# Patient Record
Sex: MALE | Race: WHITE | NOT HISPANIC OR LATINO | Employment: OTHER | URBAN - METROPOLITAN AREA
[De-identification: names, ages, dates, MRNs, and addresses within clinical notes are randomized per-mention and may not be internally consistent; named-entity substitution may affect disease eponyms.]

---

## 2018-09-27 ENCOUNTER — IMPORTED ENCOUNTER (OUTPATIENT)
Dept: URBAN - METROPOLITAN AREA CLINIC 50 | Facility: CLINIC | Age: 63
End: 2018-09-27

## 2018-10-09 ENCOUNTER — IMPORTED ENCOUNTER (OUTPATIENT)
Dept: URBAN - METROPOLITAN AREA CLINIC 50 | Facility: CLINIC | Age: 63
End: 2018-10-09

## 2018-10-09 NOTE — PATIENT DISCUSSION
"""Monitor ERM for changes. Informed patient of potential for worsening. Instructed patient to call with changes in vision. Recommend regular Amsler grid checks.  """

## 2018-11-06 ENCOUNTER — IMPORTED ENCOUNTER (OUTPATIENT)
Dept: URBAN - METROPOLITAN AREA CLINIC 50 | Facility: CLINIC | Age: 63
End: 2018-11-06

## 2018-11-06 NOTE — PATIENT DISCUSSION
"""Continue Crook Preservative Free Tears both eyes two - four times a day ."" ""Start Ocusoft Lid Scrubs both eyes . """

## 2019-10-09 NOTE — PROCEDURE NOTE: CLINICAL
PROCEDURE NOTE: Punctal Plugs, Li Marco A ([id number], [id number]) #2 Bilateral Lower Lids. 0.4mm Quintess.

## 2019-11-19 ENCOUNTER — IMPORTED ENCOUNTER (OUTPATIENT)
Dept: URBAN - METROPOLITAN AREA CLINIC 50 | Facility: CLINIC | Age: 64
End: 2019-11-19

## 2020-06-01 NOTE — PATIENT DISCUSSION
Advised regular use of Amsler grid.
Amsler grid at home. MVI/AREDS discussed. Patient to call if any changes in vision or grid card.
BLL plug today.
Cleared to continue Plaquenil.
Discussed AREDS 2 supplements, UV protection and green leafy vegetables.
Glasses Prescription given to patient.
Monitor.
Patient happy with [facility].
Recommended artificial tears to use as directed.
Recommended observation.
Smoking cessation discussed.
Stable.
VF is clear OU and mac OCT has borderline finding OS that I will follow closely. Overall, pt is stable visually.
Subdural hematoma, chronic

## 2020-06-01 NOTE — PROCEDURE NOTE: CLINICAL
PROCEDURE NOTE: Punctal Plugs, Quintess Dissolvable ([id number], [id number]) #1 Right Lower Lid. Prior to treatment, the risks/benefits/alternatives were discussed. The patient wished to proceed with procedure. Temporary collagen plugs were inserted. Patient tolerated procedure well. There were no complications. Post procedure instructions given. 0.4mm. PROCEDURE NOTE: Punctal Plugs, Shanna Keys ([id number], [id number]) #1 Left Lower Lid. Prior to treatment, the risks/benefits/alternatives were discussed. The patient wished to proceed with procedure. Temporary collagen plugs were inserted. Patient tolerated procedure well. There were no complications. Post procedure instructions given. 0.4mm.

## 2020-12-02 ENCOUNTER — IMPORTED ENCOUNTER (OUTPATIENT)
Dept: URBAN - METROPOLITAN AREA CLINIC 50 | Facility: CLINIC | Age: 65
End: 2020-12-02

## 2021-04-17 ASSESSMENT — VISUAL ACUITY
OS_OTHER: 20/100. >20/400.
OS_OTHER: 20/50.
OS_PH: 20/30
OD_OTHER: 20/50.
OD_CC: 20/25+2
OS_CC: 20/40+2
OD_CC: 20/40-
OS_CC: 20/40
OD_CC: J1
OS_CC: J1+
OD_CC: J1+
OS_BAT: 20/100
OD_BAT: 20/100
OD_CC: 20/25
OS_CC: 20/40
OS_CC: 20/30+2
OS_CC: J1+
OD_CC: J1+
OD_CC: 20/25-2
OS_CC: J1
OD_OTHER: 20/100. >20/400.

## 2021-04-17 ASSESSMENT — TONOMETRY
OD_IOP_MMHG: 11
OS_IOP_MMHG: 12
OS_IOP_MMHG: 12
OD_IOP_MMHG: 12
OD_IOP_MMHG: 10
OD_IOP_MMHG: 12
OS_IOP_MMHG: 9
OS_IOP_MMHG: 12

## 2021-12-06 ENCOUNTER — PREPPED CHART (OUTPATIENT)
Dept: URBAN - METROPOLITAN AREA CLINIC 49 | Facility: CLINIC | Age: 66
End: 2021-12-06

## 2021-12-13 ENCOUNTER — COMPREHENSIVE EXAM (OUTPATIENT)
Dept: URBAN - METROPOLITAN AREA CLINIC 53 | Facility: CLINIC | Age: 66
End: 2021-12-13

## 2021-12-13 DIAGNOSIS — H25.11: ICD-10-CM

## 2021-12-13 DIAGNOSIS — H04.123: ICD-10-CM

## 2021-12-13 DIAGNOSIS — H25.812: ICD-10-CM

## 2021-12-13 DIAGNOSIS — H43.813: ICD-10-CM

## 2021-12-13 DIAGNOSIS — H35.373: ICD-10-CM

## 2021-12-13 PROCEDURE — 92134 CPTRZ OPH DX IMG PST SGM RTA: CPT

## 2021-12-13 PROCEDURE — 92014 COMPRE OPH EXAM EST PT 1/>: CPT

## 2021-12-13 ASSESSMENT — VISUAL ACUITY
OS_CC: 20/30
OS_CC: J1+
OS_GLARE: 20/40
OD_CC: 20/30
OS_PH: 20/30
OD_GLARE: 20/40
OD_PH: 20/30
OD_CC: J1+
OD_GLARE: 20/40
OS_GLARE: 20/40

## 2021-12-13 ASSESSMENT — TONOMETRY
OS_IOP_MMHG: 12
OD_IOP_MMHG: 12

## 2022-01-18 ENCOUNTER — DIAGNOSTICS ONLY (OUTPATIENT)
Dept: URBAN - METROPOLITAN AREA CLINIC 53 | Facility: CLINIC | Age: 67
End: 2022-01-18

## 2022-01-18 DIAGNOSIS — H35.373: ICD-10-CM

## 2022-01-18 DIAGNOSIS — H25.812: ICD-10-CM

## 2022-01-18 PROCEDURE — 92025IOL CORNEAL TOPOGRAPHY PREMIUM IOL

## 2022-01-18 PROCEDURE — 92136 OPHTHALMIC BIOMETRY: CPT

## 2022-01-18 PROCEDURE — 92134 CPTRZ OPH DX IMG PST SGM RTA: CPT

## 2022-01-18 ASSESSMENT — KERATOMETRY
OD_K1POWER_DIOPTERS: 45.87
OS_AXISANGLE2_DEGREES: 9
OD_AXISANGLE_DEGREES: 95
OS_K2POWER_DIOPTERS: 42.62
OS_AXISANGLE_DEGREES: 099
OD_K2POWER_DIOPTERS: 42.37
OS_K1POWER_DIOPTERS: 47.37
OD_AXISANGLE2_DEGREES: 005

## 2022-02-07 ENCOUNTER — PRE-OP/H&P (OUTPATIENT)
Dept: URBAN - METROPOLITAN AREA CLINIC 53 | Facility: CLINIC | Age: 67
End: 2022-02-07

## 2022-02-07 DIAGNOSIS — H25.12: ICD-10-CM

## 2022-02-07 PROCEDURE — PREOP PRE OP VISIT

## 2022-02-07 ASSESSMENT — TONOMETRY
OD_IOP_MMHG: 14
OS_IOP_MMHG: 14

## 2022-02-07 ASSESSMENT — KERATOMETRY
OD_AXISANGLE2_DEGREES: 005
OS_K2POWER_DIOPTERS: 42.62
OD_AXISANGLE_DEGREES: 95
OS_AXISANGLE2_DEGREES: 9
OD_K1POWER_DIOPTERS: 45.87
OD_K2POWER_DIOPTERS: 42.37
OS_K1POWER_DIOPTERS: 47.37
OS_AXISANGLE_DEGREES: 099

## 2022-02-07 ASSESSMENT — VISUAL ACUITY
OS_PH: 20/25 PUSH
OS_CC: 20/30
OD_CC: 20/25

## 2022-02-07 NOTE — PATIENT DISCUSSION
CATARACT SURGERY PLANNER - STANDARD IOL/NO FEMTO: Phacoemulsification with IOL: Eye: left|DOS: 2/16/22|Model: [id number]|Power: 22|Target: dist|Visc: duet|Omidria: yes|10% Phenylephrine: no|Epi-shugarcaine: yes|Phaco Setting: std|BSS+: no|Trypan Blue: no|CTR: no|Olive Tip: no|Atropine: no|Pupilloplasty: no|Notes: Guttata, decline toric.

## 2022-02-07 NOTE — PATIENT DISCUSSION
HIGH ASTIGMATISM-NO TORIC/NO FEMTO: Discussed with patient that glasses or contact lenses will be needed full time after cataract surgery without a toric lens implant to correct high order astigmatism.

## 2022-02-16 ENCOUNTER — POST-OP (OUTPATIENT)
Dept: URBAN - METROPOLITAN AREA CLINIC 53 | Facility: CLINIC | Age: 67
End: 2022-02-16

## 2022-02-16 ENCOUNTER — SURGERY/PROCEDURE (OUTPATIENT)
Dept: URBAN - METROPOLITAN AREA SURGERY 16 | Facility: SURGERY | Age: 67
End: 2022-02-16

## 2022-02-16 DIAGNOSIS — H25.12: ICD-10-CM

## 2022-02-16 DIAGNOSIS — Z96.1: ICD-10-CM

## 2022-02-16 DIAGNOSIS — Z98.42: ICD-10-CM

## 2022-02-16 PROCEDURE — 99024 POSTOP FOLLOW-UP VISIT: CPT

## 2022-02-16 PROCEDURE — 66984 XCAPSL CTRC RMVL W/O ECP: CPT

## 2022-02-16 ASSESSMENT — KERATOMETRY
OD_AXISANGLE_DEGREES: 95
OD_AXISANGLE2_DEGREES: 005
OS_AXISANGLE2_DEGREES: 9
OD_AXISANGLE2_DEGREES: 005
OS_K2POWER_DIOPTERS: 42.62
OD_K2POWER_DIOPTERS: 42.37
OS_K2POWER_DIOPTERS: 42.62
OS_K1POWER_DIOPTERS: 47.37
OS_AXISANGLE_DEGREES: 099
OD_K2POWER_DIOPTERS: 42.37
OS_AXISANGLE2_DEGREES: 9
OS_AXISANGLE_DEGREES: 099
OS_K1POWER_DIOPTERS: 47.37
OD_K1POWER_DIOPTERS: 45.87
OD_K1POWER_DIOPTERS: 45.87
OD_AXISANGLE_DEGREES: 95

## 2022-02-16 ASSESSMENT — VISUAL ACUITY: OS_SC: 20/200+1

## 2022-02-16 ASSESSMENT — TONOMETRY
OS_IOP_MMHG: 32
OS_IOP_MMHG: 9

## 2022-02-21 ENCOUNTER — POST-OP (OUTPATIENT)
Dept: URBAN - METROPOLITAN AREA CLINIC 53 | Facility: CLINIC | Age: 67
End: 2022-02-21

## 2022-02-21 DIAGNOSIS — Z98.42: ICD-10-CM

## 2022-02-21 DIAGNOSIS — H35.373: ICD-10-CM

## 2022-02-21 DIAGNOSIS — Z96.1: ICD-10-CM

## 2022-02-21 PROCEDURE — 99024 POSTOP FOLLOW-UP VISIT: CPT

## 2022-02-21 PROCEDURE — 92134 CPTRZ OPH DX IMG PST SGM RTA: CPT

## 2022-02-21 ASSESSMENT — VISUAL ACUITY
OS_CC: 20/40
OD_CC: 20/40
OS_PH: 20/30

## 2022-02-21 ASSESSMENT — KERATOMETRY
OD_AXISANGLE_DEGREES: 95
OS_K1POWER_DIOPTERS: 47.37
OS_AXISANGLE2_DEGREES: 9
OD_K2POWER_DIOPTERS: 42.37
OS_AXISANGLE_DEGREES: 099
OD_K1POWER_DIOPTERS: 45.87
OS_K2POWER_DIOPTERS: 42.62
OD_AXISANGLE2_DEGREES: 005

## 2022-02-21 ASSESSMENT — TONOMETRY
OS_IOP_MMHG: 13
OD_IOP_MMHG: 13

## 2022-03-21 ENCOUNTER — PRE-OP/H&P (OUTPATIENT)
Dept: URBAN - METROPOLITAN AREA CLINIC 53 | Facility: CLINIC | Age: 67
End: 2022-03-21

## 2022-03-21 DIAGNOSIS — H25.11: ICD-10-CM

## 2022-03-21 PROCEDURE — 92136 - 2N OPHTHALMIC BIOMETRY BY PARTIAL COHERENCE INTERFEROMETRY WITH INTRAOCULAR LENS POWER CALCULATION

## 2022-03-21 PROCEDURE — PREOP PRE OP VISIT

## 2022-03-21 ASSESSMENT — VISUAL ACUITY
OD_CC: 20/30
OD_PH: 20/25
OS_SC: 20/50
OS_PH: 20/40-2

## 2022-03-21 ASSESSMENT — TONOMETRY
OD_IOP_MMHG: 12
OS_IOP_MMHG: 12

## 2022-03-21 NOTE — PATIENT DISCUSSION
CATARACT SURGERY PLANNER - STANDARD IOL/NO FEMTO: Phacoemulsification with IOL: Eye: OD|DOS: 3/30/22|Model: [id number]|Power: 22. 5|Target: dist|Visc: duet|Omidria: yes|10% Phenylephrine: no|Epi-shugarcaine: yes|Phaco Setting: std|BSS+: no|Trypan Blue: no|CTR: no|Olive Tip: no|Atropine: no|Pupilloplasty: no|Notes: Astig, guttata.

## 2022-03-30 ENCOUNTER — SURGERY/PROCEDURE (OUTPATIENT)
Dept: URBAN - METROPOLITAN AREA SURGERY 16 | Facility: SURGERY | Age: 67
End: 2022-03-30

## 2022-03-30 ENCOUNTER — POST-OP (OUTPATIENT)
Dept: URBAN - METROPOLITAN AREA CLINIC 53 | Facility: CLINIC | Age: 67
End: 2022-03-30

## 2022-03-30 DIAGNOSIS — Z98.41: ICD-10-CM

## 2022-03-30 DIAGNOSIS — H25.11: ICD-10-CM

## 2022-03-30 DIAGNOSIS — Z96.1: ICD-10-CM

## 2022-03-30 PROCEDURE — 66984 XCAPSL CTRC RMVL W/O ECP: CPT

## 2022-03-30 PROCEDURE — 99024 POSTOP FOLLOW-UP VISIT: CPT

## 2022-03-30 ASSESSMENT — TONOMETRY: OD_IOP_MMHG: 22

## 2022-03-30 ASSESSMENT — VISUAL ACUITY: OD_SC: 20/50

## 2022-04-04 ENCOUNTER — POST-OP (OUTPATIENT)
Dept: URBAN - METROPOLITAN AREA CLINIC 53 | Facility: CLINIC | Age: 67
End: 2022-04-04

## 2022-04-04 DIAGNOSIS — Z96.1: ICD-10-CM

## 2022-04-04 DIAGNOSIS — Z98.42: ICD-10-CM

## 2022-04-04 DIAGNOSIS — Z98.41: ICD-10-CM

## 2022-04-04 PROCEDURE — 99024 POSTOP FOLLOW-UP VISIT: CPT

## 2022-04-04 ASSESSMENT — VISUAL ACUITY
OS_SC: 20/50+2
OD_SC: 20/30

## 2022-04-04 ASSESSMENT — TONOMETRY
OD_IOP_MMHG: 14
OS_IOP_MMHG: 12

## 2022-05-02 ENCOUNTER — POST-OP (OUTPATIENT)
Dept: URBAN - METROPOLITAN AREA CLINIC 53 | Facility: CLINIC | Age: 67
End: 2022-05-02

## 2022-05-02 DIAGNOSIS — Z98.42: ICD-10-CM

## 2022-05-02 DIAGNOSIS — Z96.1: ICD-10-CM

## 2022-05-02 DIAGNOSIS — H26.493: ICD-10-CM

## 2022-05-02 DIAGNOSIS — H52.4: ICD-10-CM

## 2022-05-02 PROCEDURE — 92015 DETERMINE REFRACTIVE STATE: CPT

## 2022-05-02 PROCEDURE — 99024 POSTOP FOLLOW-UP VISIT: CPT

## 2022-05-02 ASSESSMENT — TONOMETRY
OS_IOP_MMHG: 10
OD_IOP_MMHG: 12

## 2022-05-02 ASSESSMENT — VISUAL ACUITY
OU_CC: 20/70
OD_PH: 20/30-1
OS_GLARE: 20/30-1
OU_SC: J2
OS_SC: 20/150
OD_SC: 20/70-1
OU_SC: 20/60
OD_GLARE: 20/30-1
OS_PH: 20/40-1

## 2022-05-02 ASSESSMENT — KERATOMETRY
OS_AXISANGLE_DEGREES: 006
OD_AXISANGLE_DEGREES: 017
OS_AXISANGLE2_DEGREES: 96
OS_K1POWER_DIOPTERS: 42.75
OD_K2POWER_DIOPTERS: 43.75
OD_K1POWER_DIOPTERS: 41.50
OD_AXISANGLE2_DEGREES: 107
OS_K2POWER_DIOPTERS: 46.75

## 2023-04-24 NOTE — PATIENT DISCUSSION
LENS OPTION (STANDARD): Discussed with patient in detail all available methods and lens options as well as their associated benefits, limitations and out-of-pocket costs. Patient chooses standard cataract surgery with monofocal lens implant and understands that reading glasses will still be needed after surgery. The patient also understands that with any IOL there is no guarantee that they will not require glasses to see their best at any distance after surgery. The risks, benefits and alternatives to surgery were explained and all questions were answered. Normal gait / station

## 2023-05-22 ENCOUNTER — COMPREHENSIVE EXAM (OUTPATIENT)
Dept: URBAN - METROPOLITAN AREA CLINIC 53 | Facility: CLINIC | Age: 68
End: 2023-05-22

## 2023-05-22 DIAGNOSIS — H16.223: ICD-10-CM

## 2023-05-22 DIAGNOSIS — Z01.01: ICD-10-CM

## 2023-05-22 DIAGNOSIS — H26.493: ICD-10-CM

## 2023-05-22 DIAGNOSIS — H52.4: ICD-10-CM

## 2023-05-22 DIAGNOSIS — H43.813: ICD-10-CM

## 2023-05-22 DIAGNOSIS — H35.373: ICD-10-CM

## 2023-05-22 PROCEDURE — 92014 COMPRE OPH EXAM EST PT 1/>: CPT

## 2023-05-22 PROCEDURE — 92134 CPTRZ OPH DX IMG PST SGM RTA: CPT

## 2023-05-22 PROCEDURE — 92015 DETERMINE REFRACTIVE STATE: CPT

## 2023-05-22 RX ORDER — SODIUM CHLORIDE 50 MG/G: OINTMENT OPHTHALMIC EVERY EVENING

## 2023-05-22 ASSESSMENT — VISUAL ACUITY
OS_GLARE: 20/40
OD_CC: 20/25
OD_GLARE: 20/20-2
OS_PH: 20/30
OU_CC: J1
OS_GLARE: 20/40
OS_CC: 20/30-2
OD_GLARE: 20/20-2

## 2023-05-22 ASSESSMENT — KERATOMETRY
OD_K1POWER_DIOPTERS: 41.50
OD_AXISANGLE_DEGREES: 017
OD_K2POWER_DIOPTERS: 43.75
OS_AXISANGLE2_DEGREES: 96
OD_AXISANGLE2_DEGREES: 107
OS_K2POWER_DIOPTERS: 46.75
OS_AXISANGLE_DEGREES: 006
OS_K1POWER_DIOPTERS: 42.75

## 2023-05-22 ASSESSMENT — TONOMETRY
OD_IOP_MMHG: 10
OS_IOP_MMHG: 12

## 2023-11-22 ENCOUNTER — FOLLOW UP (OUTPATIENT)
Dept: URBAN - METROPOLITAN AREA CLINIC 53 | Facility: CLINIC | Age: 68
End: 2023-11-22

## 2023-11-22 DIAGNOSIS — H18.513: ICD-10-CM

## 2023-11-22 PROCEDURE — 92012 INTRM OPH EXAM EST PATIENT: CPT

## 2023-11-22 RX ORDER — SODIUM CHLORIDE 50 MG/G
OINTMENT OPHTHALMIC EVERY EVENING
Start: 2023-11-22

## 2023-11-22 ASSESSMENT — VISUAL ACUITY
OS_CC: 20/40
OD_CC: 20/25+2
OU_CC: 20/20
OS_PH: 20/30

## 2023-11-22 ASSESSMENT — TONOMETRY
OS_IOP_MMHG: 10
OD_IOP_MMHG: 08

## 2023-12-26 NOTE — PATIENT DISCUSSION
Recommended observation. What Type Of Note Output Would You Prefer (Optional)?: Bullet Format How Severe Is Your Skin Lesion?: mild Has Your Skin Lesion Been Treated?: not been treated Is This A New Presentation, Or A Follow-Up?: Skin Lesions

## 2024-05-29 ENCOUNTER — COMPREHENSIVE EXAM (OUTPATIENT)
Dept: URBAN - METROPOLITAN AREA CLINIC 48 | Facility: LOCATION | Age: 69
End: 2024-05-29

## 2024-05-29 DIAGNOSIS — H16.223: ICD-10-CM

## 2024-05-29 DIAGNOSIS — H04.123: ICD-10-CM

## 2024-05-29 DIAGNOSIS — H35.372: ICD-10-CM

## 2024-05-29 DIAGNOSIS — H26.493: ICD-10-CM

## 2024-05-29 DIAGNOSIS — H43.813: ICD-10-CM

## 2024-05-29 DIAGNOSIS — H18.513: ICD-10-CM

## 2024-05-29 PROCEDURE — 99214 OFFICE O/P EST MOD 30 MIN: CPT

## 2024-05-29 PROCEDURE — 92134 CPTRZ OPH DX IMG PST SGM RTA: CPT

## 2024-05-29 ASSESSMENT — VISUAL ACUITY
OD_GLARE: 20/25
OD_GLARE: 20/40
OS_GLARE: 20/40
OU_CC: J1
OS_CC: 20/25-2
OD_SC: 20/50
OS_SC: 20/60
OD_CC: 20/20-2
OS_GLARE: 20/30

## 2024-05-29 ASSESSMENT — KERATOMETRY
OD_K1POWER_DIOPTERS: 42.75
OD_AXISANGLE2_DEGREES: 95
OS_AXISANGLE2_DEGREES: 100
OD_AXISANGLE_DEGREES: 005
OS_AXISANGLE_DEGREES: 010
OD_K2POWER_DIOPTERS: 45.50
OS_K1POWER_DIOPTERS: 43.00
OS_K2POWER_DIOPTERS: 47.000

## 2024-05-29 ASSESSMENT — TONOMETRY
OD_IOP_MMHG: 11
OS_IOP_MMHG: 12

## 2025-05-29 ENCOUNTER — COMPREHENSIVE EXAM (OUTPATIENT)
Age: 70
End: 2025-05-29

## 2025-05-29 DIAGNOSIS — H52.4: ICD-10-CM

## 2025-05-29 DIAGNOSIS — R73.03: ICD-10-CM

## 2025-05-29 DIAGNOSIS — H04.123: ICD-10-CM

## 2025-05-29 DIAGNOSIS — H43.813: ICD-10-CM

## 2025-05-29 DIAGNOSIS — H35.372: ICD-10-CM

## 2025-05-29 DIAGNOSIS — Z01.01: ICD-10-CM

## 2025-05-29 DIAGNOSIS — H26.493: ICD-10-CM

## 2025-05-29 PROCEDURE — 92015 DETERMINE REFRACTIVE STATE: CPT

## 2025-05-29 PROCEDURE — 92014 COMPRE OPH EXAM EST PT 1/>: CPT
